# Patient Record
Sex: MALE | Race: WHITE | NOT HISPANIC OR LATINO
[De-identification: names, ages, dates, MRNs, and addresses within clinical notes are randomized per-mention and may not be internally consistent; named-entity substitution may affect disease eponyms.]

---

## 2020-03-03 PROBLEM — Z00.00 ENCOUNTER FOR PREVENTIVE HEALTH EXAMINATION: Status: ACTIVE | Noted: 2020-03-03

## 2020-03-05 ENCOUNTER — APPOINTMENT (OUTPATIENT)
Dept: SURGICAL ONCOLOGY | Facility: CLINIC | Age: 75
End: 2020-03-05

## 2020-03-05 NOTE — PHYSICAL EXAM
[Normal] : supple, no neck mass and thyroid not enlarged [Normal Neck Lymph Nodes] : normal neck lymph nodes  [Normal Supraclavicular Lymph Nodes] : normal supraclavicular lymph nodes [Normal Axillary Lymph Nodes] : normal axillary lymph nodes [Normal] : normal appearance, no rash, nodules, vesicles, ulcers, erythema [de-identified] : groins not examined

## 2020-03-05 NOTE — REASON FOR VISIT
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

## 2020-03-05 NOTE — HISTORY OF PRESENT ILLNESS
[de-identified] : 74-year-old man referred by his dermatologist Dr. 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

## 2020-03-05 NOTE — ASSESSMENT
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

## 2020-03-19 ENCOUNTER — APPOINTMENT (OUTPATIENT)
Dept: SURGICAL ONCOLOGY | Facility: CLINIC | Age: 75
End: 2020-03-19
Payer: MEDICARE

## 2020-03-19 VITALS
DIASTOLIC BLOOD PRESSURE: 86 MMHG | SYSTOLIC BLOOD PRESSURE: 132 MMHG | OXYGEN SATURATION: 98 % | WEIGHT: 220 LBS | HEART RATE: 72 BPM | BODY MASS INDEX: 28.23 KG/M2 | HEIGHT: 74 IN | TEMPERATURE: 98 F

## 2020-03-19 PROCEDURE — 99204 OFFICE O/P NEW MOD 45 MIN: CPT

## 2020-03-24 ENCOUNTER — OUTPATIENT (OUTPATIENT)
Dept: OUTPATIENT SERVICES | Facility: HOSPITAL | Age: 75
LOS: 1 days | End: 2020-03-24
Payer: MEDICARE

## 2020-03-24 ENCOUNTER — APPOINTMENT (OUTPATIENT)
Dept: RADIOLOGY | Facility: CLINIC | Age: 75
End: 2020-03-24
Payer: MEDICARE

## 2020-03-24 ENCOUNTER — APPOINTMENT (OUTPATIENT)
Dept: ULTRASOUND IMAGING | Facility: CLINIC | Age: 75
End: 2020-03-24
Payer: MEDICARE

## 2020-03-24 DIAGNOSIS — Z00.8 ENCOUNTER FOR OTHER GENERAL EXAMINATION: ICD-10-CM

## 2020-03-24 PROCEDURE — 71046 X-RAY EXAM CHEST 2 VIEWS: CPT | Mod: 26

## 2020-03-24 PROCEDURE — 76536 US EXAM OF HEAD AND NECK: CPT

## 2020-03-24 PROCEDURE — 76536 US EXAM OF HEAD AND NECK: CPT | Mod: 26

## 2020-03-24 PROCEDURE — 71046 X-RAY EXAM CHEST 2 VIEWS: CPT

## 2020-04-07 ENCOUNTER — OUTPATIENT (OUTPATIENT)
Dept: OUTPATIENT SERVICES | Facility: HOSPITAL | Age: 75
LOS: 1 days | End: 2020-04-07
Payer: MEDICARE

## 2020-04-07 DIAGNOSIS — C43.39 MALIGNANT MELANOMA OF OTHER PARTS OF FACE: ICD-10-CM

## 2020-04-08 PROCEDURE — 88321 CONSLTJ&REPRT SLD PREP ELSWR: CPT

## 2020-04-10 LAB — SURGICAL PATHOLOGY STUDY: SIGNIFICANT CHANGE UP

## 2020-05-07 ENCOUNTER — APPOINTMENT (OUTPATIENT)
Dept: PLASTIC SURGERY | Facility: CLINIC | Age: 75
End: 2020-05-07
Payer: MEDICARE

## 2020-05-07 PROCEDURE — 99203 OFFICE O/P NEW LOW 30 MIN: CPT | Mod: 95

## 2020-05-07 RX ORDER — CEPHALEXIN 500 MG/1
500 TABLET ORAL 3 TIMES DAILY
Qty: 21 | Refills: 0 | Status: ACTIVE | COMMUNITY
Start: 2020-05-07 | End: 1900-01-01

## 2020-05-07 RX ORDER — OXYCODONE AND ACETAMINOPHEN 5; 325 MG/1; MG/1
5-325 TABLET ORAL
Qty: 6 | Refills: 0 | Status: ACTIVE | COMMUNITY
Start: 2020-05-07 | End: 1900-01-01

## 2020-05-08 VITALS — WEIGHT: 220.02 LBS | HEIGHT: 74 IN

## 2020-05-08 RX ORDER — RIVAROXABAN 15 MG-20MG
1 KIT ORAL
Qty: 0 | Refills: 0 | DISCHARGE

## 2020-05-08 RX ORDER — ASCORBIC ACID 60 MG
1 TABLET,CHEWABLE ORAL
Qty: 0 | Refills: 0 | DISCHARGE

## 2020-05-08 RX ORDER — METOPROLOL TARTRATE 50 MG
1 TABLET ORAL
Qty: 0 | Refills: 0 | DISCHARGE

## 2020-05-08 RX ORDER — ATORVASTATIN CALCIUM 80 MG/1
1 TABLET, FILM COATED ORAL
Qty: 0 | Refills: 0 | DISCHARGE

## 2020-05-08 NOTE — H&P PST ADULT - NSICDXFAMILYHX_GEN_ALL_CORE_FT
FAMILY HISTORY:  Father  Still living? No  Family history of colon cancer, Age at diagnosis: Age Unknown  Family history of phlebitis, Age at diagnosis: Age Unknown    Mother  Still living? No  Family history of colon cancer, Age at diagnosis: Age Unknown    Aunt  Still living? No  Family history of colon cancer, Age at diagnosis: Age Unknown    Uncle  Still living? No  Family history of colon cancer, Age at diagnosis: Age Unknown

## 2020-05-08 NOTE — H&P PST ADULT - NSANTHOSAYNRD_GEN_A_CORE
No. CAROLINE screening performed.  STOP BANG Legend: 0-2 = LOW Risk; 3-4 = INTERMEDIATE Risk; 5-8 = HIGH Risk

## 2020-05-08 NOTE — H&P PST ADULT - NSICDXPASTMEDICALHX_GEN_ALL_CORE_FT
PAST MEDICAL HISTORY:  Anxiety     Atrial fibrillation 2017    Hyperlipidemia     Melanoma left cheek    Nasal polyps bilateral    Osteoarthritis     Right cataract     Stuffy nose bilateral

## 2020-05-08 NOTE — H&P PST ADULT - SOURCE OF INFORMATION, PROFILE
history intake done via telephone as per covid 19 protocol. physical exam to be done day of surgery/patient

## 2020-05-08 NOTE — H&P PST ADULT - NSICDXPASTSURGICALHX_GEN_ALL_CORE_FT
PAST SURGICAL HISTORY:  History of tonsillectomy and adenoidectomy age 10    S/P FESS (functional endoscopic sinus surgery) multiple surgeries for nasal polyps

## 2020-05-11 ENCOUNTER — OUTPATIENT (OUTPATIENT)
Dept: OUTPATIENT SERVICES | Facility: HOSPITAL | Age: 75
LOS: 1 days | End: 2020-05-11
Payer: MEDICARE

## 2020-05-11 DIAGNOSIS — Z98.890 OTHER SPECIFIED POSTPROCEDURAL STATES: Chronic | ICD-10-CM

## 2020-05-11 DIAGNOSIS — Z01.818 ENCOUNTER FOR OTHER PREPROCEDURAL EXAMINATION: ICD-10-CM

## 2020-05-11 DIAGNOSIS — C43.39 MALIGNANT MELANOMA OF OTHER PARTS OF FACE: ICD-10-CM

## 2020-05-11 PROBLEM — J33.9 NASAL POLYP, UNSPECIFIED: Chronic | Status: ACTIVE | Noted: 2020-05-08

## 2020-05-11 PROBLEM — H26.9 UNSPECIFIED CATARACT: Chronic | Status: ACTIVE | Noted: 2020-05-08

## 2020-05-11 PROBLEM — E78.5 HYPERLIPIDEMIA, UNSPECIFIED: Chronic | Status: ACTIVE | Noted: 2020-05-08

## 2020-05-11 PROBLEM — R09.81 NASAL CONGESTION: Chronic | Status: ACTIVE | Noted: 2020-05-08

## 2020-05-11 PROBLEM — I48.91 UNSPECIFIED ATRIAL FIBRILLATION: Chronic | Status: ACTIVE | Noted: 2020-05-08

## 2020-05-11 PROBLEM — C43.9 MALIGNANT MELANOMA OF SKIN, UNSPECIFIED: Chronic | Status: ACTIVE | Noted: 2020-05-08

## 2020-05-11 PROBLEM — M19.90 UNSPECIFIED OSTEOARTHRITIS, UNSPECIFIED SITE: Chronic | Status: ACTIVE | Noted: 2020-05-08

## 2020-05-11 PROBLEM — F41.9 ANXIETY DISORDER, UNSPECIFIED: Chronic | Status: ACTIVE | Noted: 2020-05-08

## 2020-05-11 PROCEDURE — G0463: CPT

## 2020-05-12 ENCOUNTER — APPOINTMENT (OUTPATIENT)
Dept: SURGICAL ONCOLOGY | Facility: HOSPITAL | Age: 75
End: 2020-05-12

## 2020-05-12 ENCOUNTER — APPOINTMENT (OUTPATIENT)
Dept: PLASTIC SURGERY | Facility: HOSPITAL | Age: 75
End: 2020-05-12

## 2020-05-12 NOTE — PHYSICAL EXAM
[Normal] : supple, no neck mass and thyroid not enlarged [Normal Neck Lymph Nodes] : normal neck lymph nodes  [Normal Supraclavicular Lymph Nodes] : normal supraclavicular lymph nodes [Normal Axillary Lymph Nodes] : normal axillary lymph nodes [Normal] : normal appearance, no rash, nodules, vesicles, ulcers, erythema [de-identified] : groins not examined

## 2020-05-12 NOTE — REASON FOR VISIT
[Initial Consultation] : an initial consultation for [Other: _____] : [unfilled] [FreeTextEntry2] : Left cheek melanoma

## 2020-05-12 NOTE — ASSESSMENT
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  Chest x-ray.\par 2.  Neck ultrasound.\par They understand and agree, and would like to proceed.\par Paperwork entered\par \par If unremarkable, he should have wide excision with sentinel node biopsy and reconstruction (Dr. Erich SIMONS is already involved in his care).\par They understand and agree, and would like to proceed.\par Paperwork entered\par \par Reviewed in detail, all questions answered.\par \par Note dictated\par \par \par 03/24/20:\par Neck ultrasound: No adenopathy.\par Chest x-ray: Clear lungs\par \par 05/12/20.\par Patient's melanoma surgery had been scheduled for today, but he called yesterday to cancel the procedure.\par

## 2020-05-12 NOTE — HISTORY OF PRESENT ILLNESS
[de-identified] : 75 year-old man referred by his dermatologist Dr. 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 Erich CINTRON) who performed a "narrow margin" excisional biopsy which identified residual melanoma, 2.5 mm.\par \par His dermatologist is Dr. Adam BODIAN, who described it clinically as a beefy, bleeding nodule, ~15 mm in diameter.\par \par The patient had been aware of a long-standing pigmented lesion on the left cheek, which in mid January 2020 grew rapidly both radially, and started to protrude..\par This led to dermatology for evaluation, biopsy, and the above diagnosis.\par \par + Prior history:\par ~2014 he was diagnosed with a "melanoma of his left ear" when Dr. Bodian, which was excised by Dr. Erich Cintron (plastics).\par No other personal history of melanoma.\par \par + Prior non-melanoma skin cancers.\par \par No other personal history of malignancy.\par \par No relatives with melanoma.\par \par Strong family history of colon cancer (below).\par No other relatives with a history of malignancy.\par \par \par PMD: Dr. Jl PEDROZA\par He is also a cardiologist.\par \par No pacemaker or defibrillator\par \par +XARELTO for atrial fibrillation\par \par +DUPLICENT (a monoclonal antibody), for nasal polyps.\par He uses Singulair for the same reason..\par ENT: Dr. Sylvain Woodward\par \par \par Tachycardia is controlled with metoprolol.\par He takes Lipitor for hypercholesterolemia.\par \par He also takes Paxil.\par This was originally prescribed by a psychiatrist ~1990.\par He no longer follows up with them.\par The prescription is renewed "by a friend".\par \par \par 2018 Eye examination with Dr. Fernandes was unremarkable area\par He will followup in 2020\par \par \par + FH:\par Colon cancer:\par Father.\par Mother.\par A maternal aunt.\par 3 maternal uncles.\par \par His last colonoscopy was ~2010 in [place] (Dr. hCester Villareal)\par Reluctant to go for a followup, despite above family history\par

## 2020-05-12 NOTE — REVIEW OF SYSTEMS
[Negative] : Heme/Lymph [FreeTextEntry5] : Atrial fibrillation [FreeTextEntry4] : Nasal polyps [de-identified] : Melanoma [de-identified] : Takes Paxil

## 2020-05-13 PROBLEM — C43.39 MELANOMA OF CHEEK: Status: ACTIVE | Noted: 2020-03-04

## 2020-05-14 ENCOUNTER — APPOINTMENT (OUTPATIENT)
Dept: SURGICAL ONCOLOGY | Facility: CLINIC | Age: 75
End: 2020-05-14
Payer: MEDICARE

## 2020-05-14 VITALS
DIASTOLIC BLOOD PRESSURE: 77 MMHG | OXYGEN SATURATION: 99 % | HEIGHT: 74 IN | WEIGHT: 220 LBS | SYSTOLIC BLOOD PRESSURE: 116 MMHG | BODY MASS INDEX: 28.23 KG/M2 | RESPIRATION RATE: 18 BRPM | HEART RATE: 68 BPM | TEMPERATURE: 97.6 F

## 2020-05-14 DIAGNOSIS — C43.39 MALIGNANT MELANOMA OF OTHER PARTS OF FACE: ICD-10-CM

## 2020-05-14 PROCEDURE — 99215 OFFICE O/P EST HI 40 MIN: CPT

## 2020-05-14 NOTE — PHYSICAL EXAM
[Normal] : supple, no neck mass and thyroid not enlarged [Normal Neck Lymph Nodes] : normal neck lymph nodes  [Normal Supraclavicular Lymph Nodes] : normal supraclavicular lymph nodes [Normal Axillary Lymph Nodes] : normal axillary lymph nodes [Normal] : normal appearance, no rash, nodules, vesicles, ulcers, erythema [de-identified] : groins not examined

## 2020-05-14 NOTE — REASON FOR VISIT
[Other: _____] : [unfilled] [FreeTextEntry2] : Additional discussions about recently diagnosed intermediate thickness of the left cheek on his face.

## 2020-05-14 NOTE — HISTORY OF PRESENT ILLNESS
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 Adam BODIAN, described it clinically as a beefy, bleeding nodule, ~15 mm in diameter.\par \par Dr Bodian referred the Pt to plastic surgery (Dr. Erich CINTRON) who performed a "narrow margin" excisional biopsy which identified residual melanoma, 2.5 mm.\par \par The patient did not keep his March 5, 2020 appointment with us.\par He did come in for a consultation March 19, 2020.\par \par His preoperative extent of disease evaluation was unremarkable:\par 03/24/2020:\par Neck ultrasound: No adenopathy.\par Chest x-ray: Clear lungs.\par \par His operation had been scheduled for May 12, 2020, there was a significant delay in scheduling because of the coronavirus pandemic), but he call to cancel on the day prior to surgery.\par He presents today for further discussion regarding the above diagnosis.\par \par \par The patient had been aware of a long-standing pigmented lesion on the left cheek, which in mid January 2020 grew rapidly both radially, and started to protrude..\par This led to dermatology for evaluation, biopsy, and the above diagnosis.\par \par + Prior history:\par ~2014 he was diagnosed with a "melanoma of his left ear" when Dr. Bodian, which was excised by Dr. Erich Cintron (plastics).\par No other personal history of melanoma.\par \par + Prior non-melanoma skin cancers.\par \par No other personal history of malignancy.\par \par No relatives with melanoma.\par \par Strong family history of colon cancer (below).\par No other relatives with a history of malignancy.\par \par \par DERM: Dr Adam BODIAN\par \par \par PMD: Dr. Jl PEDROZA\par He is also a cardiologist.\par \par No pacemaker or defibrillator\par \par +XARELTO for atrial fibrillation\par \par +DUPLICENT (a monoclonal antibody), for nasal polyps.\par He uses Singulair for the same reason..\par ENT: Dr. Sylvain Woodward\par \par \par Tachycardia is controlled with metoprolol.\par He takes Lipitor for hypercholesterolemia.\par \par He also takes Paxil.\par This was originally prescribed by a psychiatrist ~1990.\par He no longer follows up with them.\par The prescription is renewed "by a friend".\par \par \par 2018 Eye examination with Dr. Fernandes was unremarkable area\par He will followup in 2020\par \par \par + FH:\par Colon cancer:\par Father.\par Mother.\par A maternal aunt.\par 3 maternal uncles.\par \par His last colonoscopy was ~2010 in [place] (Dr. Chester Villareal)\par Reluctant to go for a followup, despite above family history\par

## 2020-05-14 NOTE — ASSESSMENT
[FreeTextEntry1] : We had a lengthy discussion regarding the fact that he has at least an intermediate thickness melanoma, perhaps even a thick lesion.\par Thus far he has only had an excisional biopsy, not definitive surgical therapy.\par I explained the technique and rationale (again) for appropriate resection of the melanoma, which will take into consideration both appearance and function, and reconstructive considerations.\par I also reviewed (again) the concern regarding regional lymph node involvement and the indications and technique for sentinel lymphadenectomy which should be performed at the same time as extirpate of surgery.\par \par All of their questions were answered.\par \par They understand all of the above and would like me to reschedule the operation–done.

## 2020-05-14 NOTE — REVIEW OF SYSTEMS
[Negative] : Heme/Lymph [FreeTextEntry5] : Atrial fibrillation [FreeTextEntry8] : Significant family history of colorectal cancer [de-identified] : Melanoma [de-identified] : Takes Paxil

## 2020-07-31 NOTE — REASON FOR VISIT
[Initial Evaluation] : an initial evaluation [FreeTextEntry1] : Dr. Frederick Freeman (Surgical Oncology)

## 2020-07-31 NOTE — HISTORY OF PRESENT ILLNESS
[Home] : at home, [unfilled] , at the time of the visit. [Medical Office: ([facility])___] : at the medical office located in  [Spouse] : spouse

## 2020-07-31 NOTE — CONSULT LETTER
[Consult Letter:] : I had the pleasure of evaluating your patient, [unfilled]. [Dear  ___] : Dear  [unfilled], [Consult Closing:] : Thank you very much for allowing me to participate in the care of this patient.  If you have any questions, please do not hesitate to contact me. [Please see my note below.] : Please see my note below. [FreeTextEntry3] : Sidney Velazquez MD [Sincerely,] : Sincerely,

## 2021-10-03 DIAGNOSIS — Z01.818 ENCOUNTER FOR OTHER PREPROCEDURAL EXAMINATION: ICD-10-CM

## 2021-10-04 ENCOUNTER — APPOINTMENT (OUTPATIENT)
Dept: DISASTER EMERGENCY | Facility: CLINIC | Age: 76
End: 2021-10-04

## 2021-10-05 LAB — SARS-COV-2 N GENE NPH QL NAA+PROBE: NOT DETECTED

## 2022-11-22 ENCOUNTER — OFFICE (OUTPATIENT)
Dept: URBAN - METROPOLITAN AREA CLINIC 27 | Facility: CLINIC | Age: 77
Setting detail: OPHTHALMOLOGY
End: 2022-11-22
Payer: MEDICARE

## 2022-11-22 DIAGNOSIS — H25.11: ICD-10-CM

## 2022-11-22 DIAGNOSIS — H18.413: ICD-10-CM

## 2022-11-22 DIAGNOSIS — H02.825: ICD-10-CM

## 2022-11-22 DIAGNOSIS — H02.834: ICD-10-CM

## 2022-11-22 DIAGNOSIS — H02.831: ICD-10-CM

## 2022-11-22 DIAGNOSIS — H25.13: ICD-10-CM

## 2022-11-22 PROCEDURE — 99214 OFFICE O/P EST MOD 30 MIN: CPT | Performed by: OPHTHALMOLOGY

## 2022-11-22 ASSESSMENT — REFRACTION_AUTOREFRACTION
OS_CYLINDER: +2.50
OS_AXIS: 166
OD_SPHERE: UNABLE
OS_SPHERE: -2.25

## 2022-11-22 ASSESSMENT — KERATOMETRY
OD_AXISANGLE_DEGREES: 018
OS_K1POWER_DIOPTERS: 43.00
OD_K1POWER_DIOPTERS: 43.00
OS_K2POWER_DIOPTERS: 43.50
OS_AXISANGLE_DEGREES: 166
OD_K2POWER_DIOPTERS: 44.00

## 2022-11-22 ASSESSMENT — VISUAL ACUITY
OD_BCVA: 20/30-2
OS_BCVA: 20/150

## 2022-11-22 ASSESSMENT — LID POSITION - DERMATOCHALASIS
OS_DERMATOCHALASIS: LUL 2+ 3+
OD_DERMATOCHALASIS: RUL 2+ 3+

## 2022-11-22 ASSESSMENT — TONOMETRY
OD_IOP_MMHG: 16
OD_IOP_MMHG: 17
OS_IOP_MMHG: 19
OS_IOP_MMHG: 18

## 2022-11-22 ASSESSMENT — SPHEQUIV_DERIVED: OS_SPHEQUIV: -1

## 2022-11-22 ASSESSMENT — CONFRONTATIONAL VISUAL FIELD TEST (CVF)
OD_FINDINGS: FULL
OS_FINDINGS: FULL

## 2022-11-22 ASSESSMENT — AXIALLENGTH_DERIVED: OS_AL: 24.0834

## 2022-12-12 ENCOUNTER — NON-APPOINTMENT (OUTPATIENT)
Age: 77
End: 2022-12-12

## 2022-12-13 ENCOUNTER — TRANSCRIPTION ENCOUNTER (OUTPATIENT)
Age: 77
End: 2022-12-13

## 2023-01-31 ENCOUNTER — OFFICE (OUTPATIENT)
Dept: URBAN - METROPOLITAN AREA CLINIC 27 | Facility: CLINIC | Age: 78
Setting detail: OPHTHALMOLOGY
End: 2023-01-31
Payer: MEDICARE

## 2023-01-31 DIAGNOSIS — H02.825: ICD-10-CM

## 2023-01-31 DIAGNOSIS — H18.413: ICD-10-CM

## 2023-01-31 DIAGNOSIS — H25.11: ICD-10-CM

## 2023-01-31 DIAGNOSIS — H02.834: ICD-10-CM

## 2023-01-31 DIAGNOSIS — H02.831: ICD-10-CM

## 2023-01-31 DIAGNOSIS — H25.13: ICD-10-CM

## 2023-01-31 PROCEDURE — 99213 OFFICE O/P EST LOW 20 MIN: CPT | Performed by: OPHTHALMOLOGY

## 2023-01-31 PROCEDURE — 92136 OPHTHALMIC BIOMETRY: CPT | Performed by: OPHTHALMOLOGY

## 2023-01-31 ASSESSMENT — LID POSITION - DERMATOCHALASIS
OD_DERMATOCHALASIS: RUL 2+ 3+
OS_DERMATOCHALASIS: LUL 2+ 3+

## 2023-01-31 ASSESSMENT — REFRACTION_AUTOREFRACTION
OS_AXIS: 175
OD_SPHERE: UNABLE
OS_CYLINDER: +2.75
OS_SPHERE: -2.50

## 2023-01-31 ASSESSMENT — SPHEQUIV_DERIVED: OS_SPHEQUIV: -1.125

## 2023-01-31 ASSESSMENT — TONOMETRY: OD_IOP_MMHG: 17

## 2025-01-08 ENCOUNTER — NON-APPOINTMENT (OUTPATIENT)
Age: 80
End: 2025-01-08